# Patient Record
Sex: MALE | URBAN - METROPOLITAN AREA
[De-identification: names, ages, dates, MRNs, and addresses within clinical notes are randomized per-mention and may not be internally consistent; named-entity substitution may affect disease eponyms.]

---

## 2022-08-05 ENCOUNTER — APPOINTMENT (OUTPATIENT)
Dept: URBAN - METROPOLITAN AREA SURGERY 13 | Age: 27
Setting detail: DERMATOLOGY
End: 2022-08-05

## 2022-08-05 DIAGNOSIS — L20.89 OTHER ATOPIC DERMATITIS: ICD-10-CM

## 2022-08-05 PROCEDURE — OTHER PRESCRIPTION MEDICATION MANAGEMENT: OTHER

## 2022-08-05 PROCEDURE — OTHER COUNSELING: OTHER

## 2022-08-05 PROCEDURE — 99203 OFFICE O/P NEW LOW 30 MIN: CPT

## 2022-08-05 ASSESSMENT — LOCATION ZONE DERM
LOCATION ZONE: NECK
LOCATION ZONE: NECK

## 2022-08-05 ASSESSMENT — LOCATION SIMPLE DESCRIPTION DERM
LOCATION SIMPLE: LEFT ANTERIOR NECK
LOCATION SIMPLE: LEFT ANTERIOR NECK

## 2022-08-05 ASSESSMENT — LOCATION DETAILED DESCRIPTION DERM
LOCATION DETAILED: LEFT INFERIOR ANTERIOR NECK
LOCATION DETAILED: LEFT CLAVICULAR NECK

## 2022-08-05 NOTE — PROCEDURE: PRESCRIPTION MEDICATION MANAGEMENT
Detail Level: Zone
Initiate Treatment: Allegra morning,Fluticasone cr bid 2 wks out o& each month,
Continue Regimen: Elidel alternate with Fluticasone
Render In Strict Bullet Format?: No

## 2022-08-05 NOTE — PROCEDURE: COUNSELING
Detail Level: Detailed
Patient Specific Counseling (Will Not Stick From Patient To Patient): Atopic derm, mostly to eyelids, neck. Pt has done well on tacrolimus but complains of stinging to eyelids. Change to Elidel cream bid, fluticasone qd as needed.

## 2023-08-14 ENCOUNTER — APPOINTMENT (OUTPATIENT)
Dept: URBAN - METROPOLITAN AREA SURGERY 13 | Age: 28
Setting detail: DERMATOLOGY
End: 2023-08-14

## 2023-08-14 DIAGNOSIS — L21.8 OTHER SEBORRHEIC DERMATITIS: ICD-10-CM

## 2023-08-14 DIAGNOSIS — L20.89 OTHER ATOPIC DERMATITIS: ICD-10-CM

## 2023-08-14 PROCEDURE — OTHER PRESCRIPTION: OTHER

## 2023-08-14 PROCEDURE — OTHER PRESCRIPTION MEDICATION MANAGEMENT: OTHER

## 2023-08-14 PROCEDURE — 99214 OFFICE O/P EST MOD 30 MIN: CPT

## 2023-08-14 PROCEDURE — OTHER COUNSELING: OTHER

## 2023-08-14 RX ORDER — KETOCONAZOLE 20 MG/ML
SHAMPOO, SUSPENSION TOPICAL
Qty: 120 | Refills: 5 | Status: ERX | COMMUNITY
Start: 2023-08-14

## 2023-08-14 RX ORDER — TACROLIMUS 1 MG/G
OINTMENT TOPICAL
Qty: 60 | Refills: 3 | Status: ERX | COMMUNITY
Start: 2023-08-14

## 2023-08-14 ASSESSMENT — SEVERITY ASSESSMENT 2020: SEVERITY 2020: MILD

## 2023-08-14 ASSESSMENT — LOCATION SIMPLE DESCRIPTION DERM
LOCATION SIMPLE: LEFT ANTERIOR NECK
LOCATION SIMPLE: LEFT SUPERIOR EYELID

## 2023-08-14 ASSESSMENT — LOCATION ZONE DERM
LOCATION ZONE: EYELID
LOCATION ZONE: NECK

## 2023-08-14 ASSESSMENT — ITCH NUMERIC RATING SCALE: HOW SEVERE IS YOUR ITCHING?: 5

## 2023-08-14 ASSESSMENT — LOCATION DETAILED DESCRIPTION DERM
LOCATION DETAILED: LEFT LATERAL SUPERIOR EYELID
LOCATION DETAILED: LEFT SUPERIOR ANTERIOR NECK

## 2023-08-14 ASSESSMENT — BSA ECZEMA: % BODY COVERED IN ECZEMA: 8

## 2023-08-14 NOTE — PROCEDURE: PRESCRIPTION MEDICATION MANAGEMENT
Initiate Treatment: Ketoconazole shampoo nightly to scalp when flaring, then 2-3x/week for maintenance, let sit on the scalp 5-10min then rinse
Render In Strict Bullet Format?: No
Plan: Discussed fluocinonide solution but patient defers at this time and would like to try Ketoconazole shampoo first. DDX psoriasis but current flare also occurring behind ears, can apply shampoo here as well. Will follow up in 1 month
Detail Level: Zone
Modify Regimen: Lukewarm showers. Scent free, dye free laundry detergents, lotions, dryer sheets
Plan: Patient was previously prescribed Fluticasone and Elidel but did not  prescriptions since flare resolved on own. Will prescribe tacrolimus and discussed possible burning sensation after application. Stressed importance of keeping skin hydrated, recommended applying moisturizer to damp skin to further lock in moisture after showers. Follow up 1 month
Initiate Treatment: Apply tacrolimus to eyelids and neck BID or QD when flaring until resolved
Samples Given: Eucerin eczema cream

## 2023-09-19 ENCOUNTER — APPOINTMENT (OUTPATIENT)
Dept: URBAN - METROPOLITAN AREA SURGERY 13 | Age: 28
Setting detail: DERMATOLOGY
End: 2023-09-20

## 2023-09-19 DIAGNOSIS — L20.89 OTHER ATOPIC DERMATITIS: ICD-10-CM

## 2023-09-19 DIAGNOSIS — L21.8 OTHER SEBORRHEIC DERMATITIS: ICD-10-CM

## 2023-09-19 PROCEDURE — 99214 OFFICE O/P EST MOD 30 MIN: CPT

## 2023-09-19 PROCEDURE — OTHER PRESCRIPTION MEDICATION MANAGEMENT: OTHER

## 2023-09-19 PROCEDURE — OTHER MIPS QUALITY: OTHER

## 2023-09-19 PROCEDURE — OTHER PRESCRIPTION: OTHER

## 2023-09-19 PROCEDURE — OTHER COUNSELING: OTHER

## 2023-09-19 RX ORDER — FLUOCINONIDE 0.5 MG/ML
SOLUTION TOPICAL
Qty: 60 | Refills: 3 | Status: ERX | COMMUNITY
Start: 2023-09-19

## 2023-09-19 ASSESSMENT — BSA ECZEMA: % BODY COVERED IN ECZEMA: 8

## 2023-09-19 ASSESSMENT — SEVERITY ASSESSMENT 2020: SEVERITY 2020: MILD

## 2023-09-19 ASSESSMENT — LOCATION ZONE DERM
LOCATION ZONE: FACE
LOCATION ZONE: SCALP

## 2023-09-19 ASSESSMENT — LOCATION DETAILED DESCRIPTION DERM
LOCATION DETAILED: LEFT CENTRAL MALAR CHEEK
LOCATION DETAILED: RIGHT MEDIAL FRONTAL SCALP

## 2023-09-19 ASSESSMENT — LOCATION SIMPLE DESCRIPTION DERM
LOCATION SIMPLE: RIGHT SCALP
LOCATION SIMPLE: LEFT CHEEK

## 2023-09-19 ASSESSMENT — ITCH NUMERIC RATING SCALE: HOW SEVERE IS YOUR ITCHING?: 3

## 2023-09-19 NOTE — PROCEDURE: COUNSELING
Detail Level: Detailed
Patient Specific Counseling (Will Not Stick From Patient To Patient): Patient to continue Tacrolimus and otc hydrocortisone cream.

## 2023-09-19 NOTE — PROCEDURE: PRESCRIPTION MEDICATION MANAGEMENT
Initiate Treatment: Apply fluocinonide solution to scalp nightly as needed for itching no longer than 1-2 weeks out of the month
Continue Regimen: Ketoconazole shampoo to scalp 2-3x/week as maintenance, let sit on the scalp 5-10min then rinse
Render In Strict Bullet Format?: No
Detail Level: Zone
Plan: Improved since last visit but still stubborn areas on back of scalp. Will add in topical steroid to help combat. If not continuing to improve or resolve advised to follow up. Discussed with patient this is a chronic condition where sometimes areas will resolve but can also have flares at times
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 He feels hydrocortisone works best. Discussed alternating with tacrolimus so not to use steroid as often. Admits to not be consistent with topicals. Recommended Epionce Medical Barrier Cream to further help combat dry skin. If not continuing to improve contact office. Stressed importance of keeping skin moisturized as well as using scent free, dye free lotions, detergents and soaps
Modify Regimen: Alternate between OTC hydrocortisone BID and tacrolimus BID to affected areas around eyes